# Patient Record
Sex: FEMALE | Race: OTHER | URBAN - METROPOLITAN AREA
[De-identification: names, ages, dates, MRNs, and addresses within clinical notes are randomized per-mention and may not be internally consistent; named-entity substitution may affect disease eponyms.]

---

## 2020-02-03 ENCOUNTER — EMERGENCY (EMERGENCY)
Facility: HOSPITAL | Age: 25
LOS: 1 days | Discharge: HOME | End: 2020-02-03
Attending: EMERGENCY MEDICINE | Admitting: EMERGENCY MEDICINE
Payer: COMMERCIAL

## 2020-02-03 VITALS
RESPIRATION RATE: 17 BRPM | DIASTOLIC BLOOD PRESSURE: 84 MMHG | WEIGHT: 164.91 LBS | TEMPERATURE: 98 F | OXYGEN SATURATION: 100 % | SYSTOLIC BLOOD PRESSURE: 132 MMHG | HEIGHT: 67 IN | HEART RATE: 70 BPM

## 2020-02-03 PROCEDURE — 70450 CT HEAD/BRAIN W/O DYE: CPT | Mod: 26

## 2020-02-03 PROCEDURE — 99284 EMERGENCY DEPT VISIT MOD MDM: CPT

## 2020-02-03 RX ORDER — ACETAMINOPHEN 500 MG
650 TABLET ORAL ONCE
Refills: 0 | Status: COMPLETED | OUTPATIENT
Start: 2020-02-03 | End: 2020-02-03

## 2020-02-03 RX ADMIN — Medication 650 MILLIGRAM(S): at 13:16

## 2020-02-03 NOTE — ED PROVIDER NOTE - CLINICAL SUMMARY MEDICAL DECISION MAKING FREE TEXT BOX
Fell last night while intoxicated, now with dizziness and photophobia, here for CT, was negative.  Neuro exam normal.  Importance of close followup and precautions for immediate return discussed.

## 2020-02-03 NOTE — ED ADULT TRIAGE NOTE - CHIEF COMPLAINT QUOTE
patient was drinking last night and leaned over and fell. hit the right side of her head. +headache +photophobia today. denies vomiting.

## 2020-02-03 NOTE — ED PROVIDER NOTE - NSFOLLOWUPINSTRUCTIONS_ED_ALL_ED_FT
Please see your primary care doctor in the next 2-3 days for a repeat evaluation.  Please take all of today's paperwork with you.  If you don't have a doctor or would like help finding a new one, please contact our call center at [phone].    Please return immediately to the Emergency Department if you have pain, fever, trouble breathing, a rash, or if you have any other problems or concerns at all.   You can reach us at [phone], 24 hours a day, 7 days a week.     Closed Head Injury    A closed head injury is an injury to your head that may or may not involve a traumatic brain injury (TBI). Symptoms of TBI can be short or long lasting and include headache, dizziness, interference with memory or speech, fatigue, confusion, changes in sleep, mood changes, nausea, depression/anxiety, and dulling of senses. Make sure to obtain proper rest which includes getting plenty of sleep, avoiding excessive visual stimulation, and avoiding activities that may cause physical or mental stress. Avoid any situation where there is potential for another head injury, including sports.    SEEK IMMEDIATE MEDICAL CARE IF YOU HAVE ANY OF THE FOLLOWING SYMPTOMS: unusual drowsiness, vomiting, severe dizziness, seizures, lightheadedness, muscular weakness, different pupil sizes, visual changes, or clear or bloody discharge from your ears or nose.

## 2020-02-03 NOTE — ED PROVIDER NOTE - PATIENT PORTAL LINK FT
You can access the FollowMyHealth Patient Portal offered by [facility] by registering at the following website: [email or web address]. By joining [employer]’s FollowMyHealth portal, you will also be able to view your health information using other applications (apps) compatible with our system.

## 2020-02-03 NOTE — ED PROVIDER NOTE - OBJECTIVE STATEMENT
Nontoxic well appearing patient in no distress presents today stating that last night she had "a lot" to drink and was observed by her friends to have been in a seated position and reached forward to grab something and fell forward striking her right forehead on the table.  Unclear if she may have had a brief LOC but friends think not.  They say that she was otherwise ok.  This morning patient awoke with RIGHT sided headache above right eye.  Patient went to [facility] where they did a neuro exam and sent patient here for further exam due to photophobia.  Patient denies vision changes, hearing changes, balance problems, n/v/d/c, fever, rash, or other complaints.  No visible injuries to face/head.  Globe intact.

## 2020-02-03 NOTE — ED PROVIDER NOTE - PHYSICAL EXAMINATION
Neuro: awake, alert, oriented x 4, ambulatory with steady gait and balance.  Normal finger/nose, normal rhomberg, PERRLA, EOMI, CN 2-12 intact.  Normal strength, sensation, movement all extremities.  Face without evidence of injury.  No facial bone tenderness.   Visual acuity 20/20, OS, OD, OU.  MILD right photophobia felt on right
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
## 2020-02-07 DIAGNOSIS — S09.90XA UNSPECIFIED INJURY OF HEAD, INITIAL ENCOUNTER: ICD-10-CM
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
